# Patient Record
Sex: FEMALE
[De-identification: names, ages, dates, MRNs, and addresses within clinical notes are randomized per-mention and may not be internally consistent; named-entity substitution may affect disease eponyms.]

---

## 2021-07-15 PROBLEM — Z00.00 ENCOUNTER FOR PREVENTIVE HEALTH EXAMINATION: Status: ACTIVE | Noted: 2021-07-15

## 2021-10-08 ENCOUNTER — TRANSCRIPTION ENCOUNTER (OUTPATIENT)
Age: 71
End: 2021-10-08

## 2021-10-08 ENCOUNTER — RESULT REVIEW (OUTPATIENT)
Age: 71
End: 2021-10-08

## 2022-04-22 ENCOUNTER — TRANSCRIPTION ENCOUNTER (OUTPATIENT)
Age: 72
End: 2022-04-22

## 2024-05-27 ENCOUNTER — NON-APPOINTMENT (OUTPATIENT)
Age: 74
End: 2024-05-27

## 2024-09-07 ENCOUNTER — NON-APPOINTMENT (OUTPATIENT)
Age: 74
End: 2024-09-07

## 2025-02-07 ENCOUNTER — NON-APPOINTMENT (OUTPATIENT)
Age: 75
End: 2025-02-07

## 2025-02-07 ENCOUNTER — EMERGENCY (EMERGENCY)
Facility: HOSPITAL | Age: 75
LOS: 1 days | Discharge: HOME | End: 2025-02-07
Attending: EMERGENCY MEDICINE
Payer: MEDICARE

## 2025-02-07 VITALS
RESPIRATION RATE: 18 BRPM | TEMPERATURE: 98 F | OXYGEN SATURATION: 98 % | DIASTOLIC BLOOD PRESSURE: 77 MMHG | SYSTOLIC BLOOD PRESSURE: 113 MMHG | HEART RATE: 96 BPM

## 2025-02-07 VITALS
RESPIRATION RATE: 19 BRPM | DIASTOLIC BLOOD PRESSURE: 97 MMHG | WEIGHT: 162.04 LBS | HEART RATE: 89 BPM | SYSTOLIC BLOOD PRESSURE: 196 MMHG | TEMPERATURE: 98 F | OXYGEN SATURATION: 99 % | HEIGHT: 65 IN

## 2025-02-07 LAB
ALBUMIN SERPL ELPH-MCNC: 4.6 G/DL — SIGNIFICANT CHANGE UP (ref 3.3–5)
ALP SERPL-CCNC: 61 U/L — SIGNIFICANT CHANGE UP (ref 40–120)
ALT FLD-CCNC: 20 U/L — SIGNIFICANT CHANGE UP (ref 10–45)
ANION GAP SERPL CALC-SCNC: 13 MMOL/L — SIGNIFICANT CHANGE UP (ref 5–17)
AST SERPL-CCNC: 20 U/L — SIGNIFICANT CHANGE UP (ref 10–40)
BASOPHILS # BLD AUTO: 0.06 K/UL — SIGNIFICANT CHANGE UP (ref 0–0.2)
BASOPHILS NFR BLD AUTO: 1 % — SIGNIFICANT CHANGE UP (ref 0–2)
BILIRUB SERPL-MCNC: 0.3 MG/DL — SIGNIFICANT CHANGE UP (ref 0.2–1.2)
BUN SERPL-MCNC: 19 MG/DL — SIGNIFICANT CHANGE UP (ref 7–23)
CALCIUM SERPL-MCNC: 11.2 MG/DL — HIGH (ref 8.4–10.5)
CHLORIDE SERPL-SCNC: 104 MMOL/L — SIGNIFICANT CHANGE UP (ref 96–108)
CO2 SERPL-SCNC: 23 MMOL/L — SIGNIFICANT CHANGE UP (ref 22–31)
CREAT SERPL-MCNC: 0.71 MG/DL — SIGNIFICANT CHANGE UP (ref 0.5–1.3)
EGFR: 90 ML/MIN/1.73M2 — SIGNIFICANT CHANGE UP
EOSINOPHIL # BLD AUTO: 0.13 K/UL — SIGNIFICANT CHANGE UP (ref 0–0.5)
EOSINOPHIL NFR BLD AUTO: 2.2 % — SIGNIFICANT CHANGE UP (ref 0–6)
GLUCOSE SERPL-MCNC: 102 MG/DL — HIGH (ref 70–99)
HCT VFR BLD CALC: 41.1 % — SIGNIFICANT CHANGE UP (ref 34.5–45)
HGB BLD-MCNC: 13.5 G/DL — SIGNIFICANT CHANGE UP (ref 11.5–15.5)
IMM GRANULOCYTES NFR BLD AUTO: 0.2 % — SIGNIFICANT CHANGE UP (ref 0–0.9)
LYMPHOCYTES # BLD AUTO: 1.56 K/UL — SIGNIFICANT CHANGE UP (ref 1–3.3)
LYMPHOCYTES # BLD AUTO: 26 % — SIGNIFICANT CHANGE UP (ref 13–44)
MCHC RBC-ENTMCNC: 32.7 PG — SIGNIFICANT CHANGE UP (ref 27–34)
MCHC RBC-ENTMCNC: 32.8 G/DL — SIGNIFICANT CHANGE UP (ref 32–36)
MCV RBC AUTO: 99.5 FL — SIGNIFICANT CHANGE UP (ref 80–100)
MONOCYTES # BLD AUTO: 0.42 K/UL — SIGNIFICANT CHANGE UP (ref 0–0.9)
MONOCYTES NFR BLD AUTO: 7 % — SIGNIFICANT CHANGE UP (ref 2–14)
NEUTROPHILS # BLD AUTO: 3.83 K/UL — SIGNIFICANT CHANGE UP (ref 1.8–7.4)
NEUTROPHILS NFR BLD AUTO: 63.6 % — SIGNIFICANT CHANGE UP (ref 43–77)
NRBC # BLD: 0 /100 WBCS — SIGNIFICANT CHANGE UP (ref 0–0)
NRBC BLD-RTO: 0 /100 WBCS — SIGNIFICANT CHANGE UP (ref 0–0)
PLATELET # BLD AUTO: 272 K/UL — SIGNIFICANT CHANGE UP (ref 150–400)
POTASSIUM SERPL-MCNC: 4 MMOL/L — SIGNIFICANT CHANGE UP (ref 3.5–5.3)
POTASSIUM SERPL-SCNC: 4 MMOL/L — SIGNIFICANT CHANGE UP (ref 3.5–5.3)
PROT SERPL-MCNC: 7.3 G/DL — SIGNIFICANT CHANGE UP (ref 6–8.3)
RBC # BLD: 4.13 M/UL — SIGNIFICANT CHANGE UP (ref 3.8–5.2)
RBC # FLD: 13.9 % — SIGNIFICANT CHANGE UP (ref 10.3–14.5)
SODIUM SERPL-SCNC: 140 MMOL/L — SIGNIFICANT CHANGE UP (ref 135–145)
WBC # BLD: 6.01 K/UL — SIGNIFICANT CHANGE UP (ref 3.8–10.5)
WBC # FLD AUTO: 6.01 K/UL — SIGNIFICANT CHANGE UP (ref 3.8–10.5)

## 2025-02-07 PROCEDURE — 12002 RPR S/N/AX/GEN/TRNK2.6-7.5CM: CPT

## 2025-02-07 PROCEDURE — 73140 X-RAY EXAM OF FINGER(S): CPT | Mod: 26,XE,LT

## 2025-02-07 PROCEDURE — 90471 IMMUNIZATION ADMIN: CPT | Mod: XU

## 2025-02-07 PROCEDURE — 96374 THER/PROPH/DIAG INJ IV PUSH: CPT | Mod: XU

## 2025-02-07 PROCEDURE — 73130 X-RAY EXAM OF HAND: CPT

## 2025-02-07 PROCEDURE — 90715 TDAP VACCINE 7 YRS/> IM: CPT

## 2025-02-07 PROCEDURE — 73140 X-RAY EXAM OF FINGER(S): CPT

## 2025-02-07 PROCEDURE — 99285 EMERGENCY DEPT VISIT HI MDM: CPT | Mod: 25

## 2025-02-07 PROCEDURE — 96375 TX/PRO/DX INJ NEW DRUG ADDON: CPT | Mod: XU

## 2025-02-07 PROCEDURE — 80053 COMPREHEN METABOLIC PANEL: CPT

## 2025-02-07 PROCEDURE — 73130 X-RAY EXAM OF HAND: CPT | Mod: 26,LT

## 2025-02-07 PROCEDURE — 85025 COMPLETE CBC W/AUTO DIFF WBC: CPT

## 2025-02-07 PROCEDURE — 99284 EMERGENCY DEPT VISIT MOD MDM: CPT | Mod: 25

## 2025-02-07 RX ORDER — CLOSTRIDIUM TETANI TOXOID ANTIGEN (FORMALDEHYDE INACTIVATED), CORYNEBACTERIUM DIPHTHERIAE TOXOID ANTIGEN (FORMALDEHYDE INACTIVATED), BORDETELLA PERTUSSIS TOXOID ANTIGEN (GLUTARALDEHYDE INACTIVATED), BORDETELLA PERTUSSIS FILAMENTOUS HEMAGGLUTININ ANTIGEN (FORMALDEHYDE INACTIVATED), BORDETELLA PERTUSSIS PERTACTIN ANTIGEN, AND BORDETELLA PERTUSSIS FIMBRIAE 2/3 ANTIGEN 5; 2; 2.5; 5; 3; 5 [LF]/.5ML; [LF]/.5ML; UG/.5ML; UG/.5ML; UG/.5ML; UG/.5ML
0.5 INJECTION, SUSPENSION INTRAMUSCULAR ONCE
Refills: 0 | Status: COMPLETED | OUTPATIENT
Start: 2025-02-07 | End: 2025-02-07

## 2025-02-07 RX ORDER — CEFAZOLIN SODIUM IN 0.9 % NACL 2 G/10 ML
1000 SYRINGE (ML) INTRAVENOUS ONCE
Refills: 0 | Status: COMPLETED | OUTPATIENT
Start: 2025-02-07 | End: 2025-02-07

## 2025-02-07 RX ORDER — ACETAMINOPHEN 160 MG/5ML
1000 SUSPENSION ORAL ONCE
Refills: 0 | Status: COMPLETED | OUTPATIENT
Start: 2025-02-07 | End: 2025-02-07

## 2025-02-07 RX ORDER — CEPHALEXIN 500 MG
1 CAPSULE ORAL
Qty: 28 | Refills: 0
Start: 2025-02-07 | End: 2025-02-13

## 2025-02-07 RX ORDER — LIDOCAINE HYDROCHLORIDE 10 MG/ML
5 INJECTION EPIDURAL; INFILTRATION; INTRACAUDAL ONCE
Refills: 0 | Status: COMPLETED | OUTPATIENT
Start: 2025-02-07 | End: 2025-02-07

## 2025-02-07 RX ADMIN — CLOSTRIDIUM TETANI TOXOID ANTIGEN (FORMALDEHYDE INACTIVATED), CORYNEBACTERIUM DIPHTHERIAE TOXOID ANTIGEN (FORMALDEHYDE INACTIVATED), BORDETELLA PERTUSSIS TOXOID ANTIGEN (GLUTARALDEHYDE INACTIVATED), BORDETELLA PERTUSSIS FILAMENTOUS HEMAGGLUTININ ANTIGEN (FORMALDEHYDE INACTIVATED), BORDETELLA PERTUSSIS PERTACTIN ANTIGEN, AND BORDETELLA PERTUSSIS FIMBRIAE 2/3 ANTIGEN 0.5 MILLILITER(S): 5; 2; 2.5; 5; 3; 5 INJECTION, SUSPENSION INTRAMUSCULAR at 23:26

## 2025-02-07 RX ADMIN — LIDOCAINE HYDROCHLORIDE 5 MILLILITER(S): 10 INJECTION EPIDURAL; INFILTRATION; INTRACAUDAL at 23:19

## 2025-02-07 RX ADMIN — Medication 100 MILLIGRAM(S): at 20:40

## 2025-02-07 RX ADMIN — ACETAMINOPHEN 400 MILLIGRAM(S): 160 SUSPENSION ORAL at 20:40

## 2025-02-07 NOTE — ED PROVIDER NOTE - PATIENT PORTAL LINK FT
You can access the FollowMyHealth Patient Portal offered by [facility] by registering at the following website: [email or web address]. By joining [facility]’s FollowMyHealth portal, you will also be able to view your health information using other applications (apps) compatible with our system.

## 2025-02-07 NOTE — ED PROVIDER NOTE - CARE PROVIDER_API CALL
Lata Butler  Plastic Surgery  [address] [place] [address], [address]  [place] [address]  Phone: [phone]  Fax: [phone]  Follow Up Time:

## 2025-02-07 NOTE — ED PROCEDURE NOTE - PROCEDURE ADDITIONAL DETAILS
left hand 3 lacerations  1) index PIP deep lac, 2.5cm, with flexor tendon rupture and nerve injury- only skin with 8 nylon stitches.  2) palm superficial 1.5cm- 4 nylon stitches.  3) superficial 0.5cm laceration on 1st PIP-closed with Dermabond.  Bacitracin/telpha and bandage applied on the sutured wounds.  A finger splint applied on index finger.

## 2025-02-07 NOTE — ED PROCEDURE NOTE - ATTENDING APP SHARED VISIT CONTRIBUTION OF CARE
I, EM Attending, Lan Garcia was present for and supervised the critical portions of the procedure performed by the Resident/Fellow Physician or MELISA.

## 2025-02-07 NOTE — ED PROVIDER NOTE - ATTENDING APP SHARED VISIT CONTRIBUTION OF CARE
I, Lan Garcia MD, Emergency Medicine Attending Physician, personally saw and examined the patient and I personally made/approve the management plan and take responsibility for the patient management.    MDM: 74-year-old female who is otherwise healthy who presents with multiple lacerations to the left hand, Mo significantly to the volar aspect of the left second digit.  Patient states that she broke a wine glass in her hand accidentally which shattered and cut her hand.  Patient states she is unable to flex her index finger, and has associated numbness.    ROS: denies fevers, chills, or any other areas of pain.     On examination, patient with elevated blood pressure otherwise stable vitals, well-appearing, in no acute distress.  Head NCAT, neck with no tenderness, normal range of motion.  Cardiac examination RRR, lungs CTAB.  Abdomen is soft and nontender.  Left upper extremity examination shows linear laceration over the volar aspect of the proximal phalanx of the left second digit.  Patient also with small laceration to the palmar aspect of her left hand.  Finger/hand examination with (+) inability to flex at the PIP or DIP actively or against resistance.  Otherwise the remainder of the finger/hand exam shows that there is no bony tenderness or deformity and has normal active, passive and resisted range of motion of all 5 digits including flexion and extension at the MCP, PIP and DIP joints.  Neurovascularly intact in all 4 extremities with 5/5 strength, normal sensation, equal pulses and brisk capillary refill.  Cranial nerves III-XII intact, no pronator drift, normal speech and gait.    Will obtain labs to evaluate for hematologic disorder, metabolic derangements, hepatic and renal function.  Obtain x-ray of the left hand and finger.  Pain control, antibiotics, Tdap.    Consulted hand specialist/plastic surgeon Dr. Lata Butler who was made aware of the concern for patient's symptoms and exam which is consistent with traumatic laceration of flexor tendon as well as nerve injury given inability to flex and patient's numbness.  He states that the patient does not require any emergent consultation or plastics repair, and states patient should obtain ED team laceration repair, dorsal splint and antibiotics, and follow-up for outpatient follow-up as he would not perform tendon repair or nerve repair in the ED or in the OR, and it would not .  I provided the patient's name, date of birth, MRN, phone number to Dr. Butler and he states he will follow-up with her as an outpatient.  Patient notified of this and verbalized understanding and agrees with plan of care.

## 2025-02-07 NOTE — ED PROVIDER NOTE - OBJECTIVE STATEMENT
75yo female, no PMHx presents to ED with multiple laceration of left non dominant including index finger deep laceration and numbness with broken glasses today. Reports she tripped and fell carrying a wine bottle and noticed multiple lacerations on left hand with shattered wine glass. Reports she can't move the index finger with numbness. She's evaluated in UC and sent to ED for hand consult. Denies other injuries. Denies fever, chills, or recent sickness. Unsure last TD.

## 2025-02-07 NOTE — ED PROVIDER NOTE - PHYSICAL EXAMINATION
NAD. Hypertensive NAD. Hypertensive, Afebrile. Neck supple. Lungs clear. No spinal tender. No chest wall, rib, or cva tender. ABD soft, non tender. No hip tender. +Left hand; Multiple lacerations, deep 2.5cm laceration on 2nd index finger. PIP volar aspect, with flexor tendon rupture and nerve injury. superficial 1.5cm laceration on palm, superficial 0.5cm laceration on PIP, dorsum aspect.

## 2025-02-07 NOTE — ED ADULT TRIAGE NOTE - CHIEF COMPLAINT QUOTE
shattered wine glass to L hand today. On baby aspirin.   Noted with immobilizer and ace wrap from UC.

## 2025-02-07 NOTE — ED PROVIDER NOTE - CARE PLAN
Principal Discharge DX:	Laceration of left index finger with complication  Goal:	PIP  Secondary Diagnosis:	Laceration of finger of left hand   1

## 2025-02-07 NOTE — ED PROVIDER NOTE - PROGRESS NOTE DETAILS
Spoke to Dr. Butler for hand consult for flexor tendon rupture and nerve damage and was recommended skin sutures in ED and out pt f/u with his office for surgery next week.

## 2025-02-07 NOTE — ED ADULT NURSE NOTE - OBJECTIVE STATEMENT
The pt is a 74 y F w a pmh of HTN,. pt came in today after carrying wine at home and it had falledn and cut her L hadn and went to  where they said that she should come to the er. pt did not take anything for pain today and denies chest pain feverc, chills n/v/d/c. Pt was placed violette gown I a strecher with comfort and saftey measures provided

## 2025-02-07 NOTE — ED PROVIDER NOTE - NSFOLLOWUPINSTRUCTIONS_ED_ALL_ED_FT
You are seen in ED for multiple lacerations of left hand including deep laceration of left index finger with flexor tendon and nerve injury. Please see the information of Sutured wound care and Tendon rupture.     Elevate the affected hand.    Keep the wound clean and dry as instructed.    Keep the finger splint as instructed.    Take Keflex as prescribed.    Take Ibuprofen (400mg every 8hours with food) or Tylenol (2 tablets of 500mg every 8hours) as needed for pain.    Follow up with Dr. Butler for surgery next week, call Monday for appointment.    Return for any concerns, fever, chills, redness/discharge around wounds, or worsening numbness.